# Patient Record
Sex: MALE | Race: WHITE
[De-identification: names, ages, dates, MRNs, and addresses within clinical notes are randomized per-mention and may not be internally consistent; named-entity substitution may affect disease eponyms.]

---

## 2020-03-02 ENCOUNTER — HEALTH MAINTENANCE LETTER (OUTPATIENT)
Age: 55
End: 2020-03-02

## 2020-12-14 ENCOUNTER — HEALTH MAINTENANCE LETTER (OUTPATIENT)
Age: 55
End: 2020-12-14

## 2021-04-18 ENCOUNTER — HEALTH MAINTENANCE LETTER (OUTPATIENT)
Age: 56
End: 2021-04-18

## 2021-10-02 ENCOUNTER — HEALTH MAINTENANCE LETTER (OUTPATIENT)
Age: 56
End: 2021-10-02

## 2022-05-14 ENCOUNTER — HEALTH MAINTENANCE LETTER (OUTPATIENT)
Age: 57
End: 2022-05-14

## 2022-09-03 ENCOUNTER — HEALTH MAINTENANCE LETTER (OUTPATIENT)
Age: 57
End: 2022-09-03

## 2023-06-03 ENCOUNTER — HEALTH MAINTENANCE LETTER (OUTPATIENT)
Age: 58
End: 2023-06-03

## 2025-05-01 ENCOUNTER — HOSPITAL ENCOUNTER (EMERGENCY)
Facility: CLINIC | Age: 60
Discharge: HOME | End: 2025-05-01
Attending: PHYSICIAN ASSISTANT
Payer: COMMERCIAL

## 2025-05-01 ENCOUNTER — APPOINTMENT (OUTPATIENT)
Dept: GENERAL RADIOLOGY | Facility: CLINIC | Age: 60
End: 2025-05-01
Attending: EMERGENCY MEDICINE
Payer: COMMERCIAL

## 2025-05-01 VITALS
SYSTOLIC BLOOD PRESSURE: 195 MMHG | HEIGHT: 66 IN | DIASTOLIC BLOOD PRESSURE: 102 MMHG | OXYGEN SATURATION: 99 % | BODY MASS INDEX: 29.73 KG/M2 | TEMPERATURE: 97 F | RESPIRATION RATE: 18 BRPM | WEIGHT: 185 LBS | HEART RATE: 50 BPM

## 2025-05-01 DIAGNOSIS — S89.91XA KNEE INJURY, RIGHT, INITIAL ENCOUNTER: ICD-10-CM

## 2025-05-01 PROCEDURE — 96372 THER/PROPH/DIAG INJ SC/IM: CPT | Performed by: PHYSICIAN ASSISTANT

## 2025-05-01 PROCEDURE — 250N000013 HC RX MED GY IP 250 OP 250 PS 637: Performed by: PHYSICIAN ASSISTANT

## 2025-05-01 PROCEDURE — 250N000011 HC RX IP 250 OP 636: Mod: JZ | Performed by: PHYSICIAN ASSISTANT

## 2025-05-01 PROCEDURE — 99284 EMERGENCY DEPT VISIT MOD MDM: CPT

## 2025-05-01 PROCEDURE — 73562 X-RAY EXAM OF KNEE 3: CPT | Mod: RT

## 2025-05-01 RX ORDER — OXYCODONE HYDROCHLORIDE 5 MG/1
10 TABLET ORAL ONCE
Status: COMPLETED | OUTPATIENT
Start: 2025-05-01 | End: 2025-05-01

## 2025-05-01 RX ORDER — OXYCODONE HYDROCHLORIDE 5 MG/1
5 TABLET ORAL EVERY 6 HOURS PRN
Qty: 12 TABLET | Refills: 0 | Status: SHIPPED | OUTPATIENT
Start: 2025-05-01 | End: 2025-05-04

## 2025-05-01 RX ORDER — KETOROLAC TROMETHAMINE 15 MG/ML
15 INJECTION, SOLUTION INTRAMUSCULAR; INTRAVENOUS ONCE
Status: COMPLETED | OUTPATIENT
Start: 2025-05-01 | End: 2025-05-01

## 2025-05-01 RX ADMIN — OXYCODONE HYDROCHLORIDE 10 MG: 5 TABLET ORAL at 21:31

## 2025-05-01 RX ADMIN — KETOROLAC TROMETHAMINE 15 MG: 15 INJECTION, SOLUTION INTRAMUSCULAR; INTRAVENOUS at 21:31

## 2025-05-01 ASSESSMENT — COLUMBIA-SUICIDE SEVERITY RATING SCALE - C-SSRS
2. HAVE YOU ACTUALLY HAD ANY THOUGHTS OF KILLING YOURSELF IN THE PAST MONTH?: NO
6. HAVE YOU EVER DONE ANYTHING, STARTED TO DO ANYTHING, OR PREPARED TO DO ANYTHING TO END YOUR LIFE?: NO
1. IN THE PAST MONTH, HAVE YOU WISHED YOU WERE DEAD OR WISHED YOU COULD GO TO SLEEP AND NOT WAKE UP?: NO

## 2025-05-01 ASSESSMENT — ACTIVITIES OF DAILY LIVING (ADL): ADLS_ACUITY_SCORE: 41

## 2025-05-02 NOTE — DISCHARGE INSTRUCTIONS
A quadriceps tendon tear is a serious injury where the tendon connecting the quadriceps muscle (the muscles in the front of your thigh) to the kneecap (patella) is torn. This can happen partially or completely, impacting your ability to straighten your knee and walk. Symptoms include a popping sensation, pain, swelling, bruising, and difficulty straightening or extending the knee.   Here's a more detailed look:  What is a quadriceps tendon?  The quadriceps tendon is a strong, fibrous cord that attaches the quadriceps muscles to the kneecap.   How does a tear happen?  Quadriceps tendon tears are often caused by a forceful impact or sudden, violent contraction of the quadriceps muscle, like landing from a jump or a sudden change in direction.   Symptoms:  Popping or tearing sensation: A sudden, sharp pain is common, often described as a popping sound or feeling.   Pain and swelling: The knee and surrounding area will be painful and may swell up.   Bruising: Bruising may appear around the knee.   Difficulty straightening the knee: You may be unable to straighten your leg or may experience a buckling or giving way of the knee.   Indentations at the top of the kneecap: A noticeable indentation may be present where the tendon tore.   Difficulty walking: Walking may be difficult due to the knee buckling or giving way.   Diagnosis:  A doctor will likely perform a physical exam and may order X-rays or an MRI to confirm the diagnosis.   Treatment:  Treatment depends on the severity of the tear:  Partial tears: May be treated with immobilization (using a brace), pain medication, and physical therapy to regain strength and flexibility.   Complete tears: Often require surgical repair to reattach the tendon and regain full knee function. Surgery may involve suturing the tendon back to the patella or bone.   Complications of untreated tears:  Untreated tears can lead to chronic knee pain, weakness, and a loss of function. It can  also increase the risk of developing osteoarthritis.   Rehabilitation:  After surgery or conservative treatment, physical therapy is crucial to restore strength, flexibility, and range of motion.   Return to activity:  The healing process can take several months, and it may take a year or more to fully recover and return to pre-injury activity levels.

## 2025-05-02 NOTE — ED PROVIDER NOTES
"  Emergency Department Note      History of Present Illness     Chief Complaint   Knee Injury    HPI   Harinder Balderas is a 59 year old male with history of type 2 diabetes and DVT who presents to the ED with his wife for evaluation of a knee injury. The patient reports he was going to step on a treadmill and thought that he turned it off but he didn't. When he was doing this, his right knee locked up and he fell. He has never had issues with his knee locking up before. Patient is now unable to fully straighten his leg on his own, stating that it feels like his muscles are tightening up. He did not take any pain medications. Denies history of right knee injuries or surgeries, but mentions he's had surgery on the left knee. Denies blood thinner use.     Independent Historian   None    Review of External Notes     Past Medical History     Medical History and Problem List   Adenocarcinoma of prostate  Chondromalacia  Chronic gout  Diverticulosis   DVT  Hodgkin's lymphoma   Impingement syndrome of left shoulder  Obstructive sleep apnea  Right bundle branch block   Tubular adenoma of colon  Type 2 diabetes     Medications   Antivert  Cialis   Glucophage   Revatio  Zyloprim     Surgical History   Bunionectomy, left   Cyst removal, left eye  Lasik procedure   Lymph node dissection  Meniscectomy  Right knee arthroscopic partial medial meniscectomy  Robotic assisted radical prostatectomy with bilateral lymph node dissection, lithotomy      Physical Exam     Patient Vitals for the past 24 hrs:   BP Temp Temp src Pulse Resp SpO2 Height Weight   05/01/25 2019 (!) 195/102 -- -- -- -- -- -- --   05/01/25 2017 -- 97  F (36.1  C) Temporal 50 18 99 % 1.676 m (5' 6\") 83.9 kg (185 lb)     Physical Exam    Physical Exam  Vitals and nursing note reviewed.   Eyes:      General: No scleral icterus.     Conjunctiva/sclera: Conjunctivae normal.   Pulmonary:      Effort: Pulmonary effort is normal.   Musculoskeletal:      Right hip: No " deformity or tenderness.      Left hip: No deformity or tenderness.      Right upper leg: No swelling, deformity or tenderness.      Left upper leg: No swelling, deformity or tenderness.      Right knee: Swelling present. No erythema or ecchymosis. Decreased range of motion. Tenderness present over the medial joint line and lateral joint line. No patellar tendon tenderness. No LCL laxity, MCL laxity, ACL laxity or PCL laxity. Normal alignment and normal patellar mobility.      Instability Tests: Anterior Lachman test negative.      Left knee: No swelling, erythema or ecchymosis. Normal range of motion. No patellar tendon tenderness. No LCL laxity, MCL laxity, ACL laxity or PCL laxity.     Right lower leg: No swelling or deformity.      Left lower leg: No swelling or deformity.      Right ankle: No swelling or deformity. No tenderness. Normal range of motion. Normal pulse.      Right foot: Normal range of motion and normal capillary refill. No swelling, deformity or tenderness.        Legs:       Comments: Patient has inability to perform any right knee extension.  He has full strength with flexion and extension of his right ankle.  Has normal strength of his hip flexors-extensors.    Pain is minimal with passive range of motion of his right knee.   Skin:     General: Skin is warm.      Findings: No bruising.   Neurological:      Mental Status: He is alert and oriented to person, place, and time. Mental status is at baseline.   Psychiatric:         Mood and Affect: Mood normal.         Behavior: Behavior normal.         Thought Content: Thought content normal.     Diagnostics     Lab Results   None     Imaging   XR Knee Right 3 Views   Final Result   IMPRESSION: No fracture or effusion. Two small ossific densities along the anterior margin of the distal femur likely represent loose bodies in the suprapatellar recess.        EKG   None     Independent Interpretation   X-ray of the right knee shows no acute fracture or  dislocation                ED Course      Medications Administered   Medications   ketorolac (TORADOL) injection 15 mg (15 mg Intramuscular $Given 5/1/25 2131)   oxyCODONE (ROXICODONE) tablet 10 mg (10 mg Oral $Given 5/1/25 2131)     Procedures   None      Discussion of Management   None    ED Course   ED Course as of 05/01/25 2341   Thu May 01, 2025   2102 I obtained history and examined the patient as noted above.      Additional Documentation  None    Medical Decision Making / Diagnosis     CMS Diagnoses: None    MIPS       None    MDM   Harinder Balderas is a 59 year old male who presents with inability to extend his right knee following trauma to his right knee earlier today.  I have high clinical suspicion that the patient completely ruptured his quadriceps tendon.  He is neurovascularly intact.  X-ray imaging shows no evidence of dislocation or fracture.  He will be placed in a knee immobilizer.  Pain is improved with knee immobilizer and pain medications.  He will be provided with limited prescription of oxycodone and recommended to utilize Motrin and Tylenol.  Icing to the knee and elevating.  Close orthopedic follow-up is indicated likely will require outpatient MRI.  He should remain nonweightbearing to his right leg and knee immobilizer utilizing crutches until cleared by orthopedics.    Return precautions were discussed regarding signs and symptoms of compartment syndrome and worsening condition or increasing pain of his right knee/leg.    Blood pressure was elevated today.  Likely related to pain.  Should monitor blood pressure at home and close primary care follow-up if blood pressure remains elevated.    Disposition   The patient was discharged.     Diagnosis     ICD-10-CM    1. Knee injury, right, initial encounter  S89.91XA          Discharge Medications   Discharge Medication List as of 5/1/2025  9:48 PM        START taking these medications    Details   oxyCODONE (ROXICODONE) 5 MG tablet Take 1  tablet (5 mg) by mouth every 6 hours as needed for pain., Disp-12 tablet, R-0, Local Print           Scribe Disclosure:  I, SOLO TOLBERT, am serving as a scribe at 9:19 PM on 5/1/2025 to document services personally performed by Dayne Blanc PA-C based on my observations and the provider's statements to me.      Dayne Blanc PA-C  05/01/25 [id number]

## 2025-05-11 ENCOUNTER — HEALTH MAINTENANCE LETTER (OUTPATIENT)
Age: 60
End: 2025-05-11